# Patient Record
Sex: MALE | URBAN - METROPOLITAN AREA
[De-identification: names, ages, dates, MRNs, and addresses within clinical notes are randomized per-mention and may not be internally consistent; named-entity substitution may affect disease eponyms.]

---

## 2021-09-30 ENCOUNTER — APPOINTMENT (RX ONLY)
Dept: URBAN - METROPOLITAN AREA CLINIC 2 | Facility: CLINIC | Age: 36
Setting detail: DERMATOLOGY
End: 2021-09-30

## 2021-09-30 DIAGNOSIS — L21.8 OTHER SEBORRHEIC DERMATITIS: ICD-10-CM

## 2021-09-30 DIAGNOSIS — L90.5 SCAR CONDITIONS AND FIBROSIS OF SKIN: ICD-10-CM

## 2021-09-30 DIAGNOSIS — Z71.89 OTHER SPECIFIED COUNSELING: ICD-10-CM

## 2021-09-30 DIAGNOSIS — L72.11 PILAR CYST: ICD-10-CM

## 2021-09-30 DIAGNOSIS — L71.8 OTHER ROSACEA: ICD-10-CM

## 2021-09-30 DIAGNOSIS — D22 MELANOCYTIC NEVI: ICD-10-CM

## 2021-09-30 PROBLEM — D22.5 MELANOCYTIC NEVI OF TRUNK: Status: ACTIVE | Noted: 2021-09-30

## 2021-09-30 PROBLEM — D22.4 MELANOCYTIC NEVI OF SCALP AND NECK: Status: ACTIVE | Noted: 2021-09-30

## 2021-09-30 PROBLEM — D22.39 MELANOCYTIC NEVI OF OTHER PARTS OF FACE: Status: ACTIVE | Noted: 2021-09-30

## 2021-09-30 PROCEDURE — ? REFUSAL OF TREATMENT

## 2021-09-30 PROCEDURE — ? COUNSELING

## 2021-09-30 PROCEDURE — 99204 OFFICE O/P NEW MOD 45 MIN: CPT

## 2021-09-30 PROCEDURE — ? OBSERVATION AND MEASURE

## 2021-09-30 PROCEDURE — ? PRESCRIPTION

## 2021-09-30 PROCEDURE — ? DEFER

## 2021-09-30 RX ORDER — DOXYCYCLINE HYCLATE 100 MG/1
1 CAPSULE, GELATIN COATED ORAL QD
Qty: 30 | Refills: 2 | Status: ERX | COMMUNITY
Start: 2021-09-30

## 2021-09-30 RX ORDER — AZELAIC ACID 0.15 G/G
1 GEL TOPICAL BID
Qty: 50 | Refills: 6 | Status: ERX | COMMUNITY
Start: 2021-09-30

## 2021-09-30 RX ADMIN — DOXYCYCLINE HYCLATE 1: 100 CAPSULE, GELATIN COATED ORAL at 00:00

## 2021-09-30 RX ADMIN — AZELAIC ACID 1: 0.15 GEL TOPICAL at 00:00

## 2021-09-30 ASSESSMENT — LOCATION ZONE DERM
LOCATION ZONE: SCALP
LOCATION ZONE: FACE
LOCATION ZONE: TRUNK
LOCATION ZONE: NOSE
LOCATION ZONE: LEG

## 2021-09-30 ASSESSMENT — LOCATION DETAILED DESCRIPTION DERM
LOCATION DETAILED: RIGHT MEDIAL PROXIMAL PRETIBIAL REGION
LOCATION DETAILED: RIGHT CENTRAL MALAR CHEEK
LOCATION DETAILED: LEFT SUPERIOR PARIETAL SCALP
LOCATION DETAILED: LEFT CENTRAL MALAR CHEEK
LOCATION DETAILED: RIGHT INFERIOR PARIETAL SCALP
LOCATION DETAILED: RIGHT LATERAL ABDOMEN
LOCATION DETAILED: NASAL DORSUM
LOCATION DETAILED: MID-FRONTAL SCALP
LOCATION DETAILED: LEFT CENTRAL FRONTAL SCALP
LOCATION DETAILED: RIGHT LATERAL TEMPLE
LOCATION DETAILED: LEFT CHIN

## 2021-09-30 ASSESSMENT — LOCATION SIMPLE DESCRIPTION DERM
LOCATION SIMPLE: RIGHT CHEEK
LOCATION SIMPLE: RIGHT TEMPLE
LOCATION SIMPLE: ABDOMEN
LOCATION SIMPLE: RIGHT PRETIBIAL REGION
LOCATION SIMPLE: SCALP
LOCATION SIMPLE: LEFT CHEEK
LOCATION SIMPLE: ANTERIOR SCALP
LOCATION SIMPLE: CHIN
LOCATION SIMPLE: NOSE

## 2021-09-30 ASSESSMENT — SEVERITY ASSESSMENT OVERALL AMONG ALL PATIENTS
IN YOUR EXPERIENCE, AMONG ALL PATIENTS YOU HAVE SEEN WITH THIS CONDITION, HOW SEVERE IS THIS PATIENT'S CONDITION?: SEVERE

## 2021-09-30 NOTE — PROCEDURE: COUNSELING
Detail Level: Zone
Sunscreen Recommendations: At home photo documentation will help tracking for any changes.
Detail Level: Detailed
Sunscreen Recommendations: Discussed importance of photo protection with mineral based sunscreen and photo protective clothing.
Detail Level: Simple
Sunscreen Recommendations: Daily SPF minimum photo protection, photo protective clothing
Detail Level: Generalized

## 2021-09-30 NOTE — PROCEDURE: DEFER
Scheduling Instructions (Optional): 2cm
Procedure To Be Performed At Next Visit: Excision
Detail Level: Zone
Introduction Text (Please End With A Colon): The following procedure was deferred:

## 2021-09-30 NOTE — PROCEDURE: OBSERVATION
Detail Level: Detailed
Size Of Lesion: 9mm
Morphology Per Location (Optional): Pigmented macule, with ring of pigment around
Size Of Lesion: 3mm
Morphology Per Location (Optional): Pigmented macule
Size Of Lesion: 4mm